# Patient Record
Sex: FEMALE | Race: WHITE | Employment: STUDENT | URBAN - METROPOLITAN AREA
[De-identification: names, ages, dates, MRNs, and addresses within clinical notes are randomized per-mention and may not be internally consistent; named-entity substitution may affect disease eponyms.]

---

## 2018-06-30 ENCOUNTER — HOSPITAL ENCOUNTER (EMERGENCY)
Facility: CLINIC | Age: 18
Discharge: HOME | End: 2018-06-30
Attending: EMERGENCY MEDICINE | Admitting: EMERGENCY MEDICINE
Payer: OTHER GOVERNMENT

## 2018-06-30 VITALS
OXYGEN SATURATION: 100 % | HEART RATE: 77 BPM | SYSTOLIC BLOOD PRESSURE: 113 MMHG | BODY MASS INDEX: 19.99 KG/M2 | HEIGHT: 65 IN | TEMPERATURE: 98 F | DIASTOLIC BLOOD PRESSURE: 69 MMHG | WEIGHT: 120 LBS | RESPIRATION RATE: 18 BRPM
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
## 2018-06-30 DIAGNOSIS — S60.561A INSECT BITE OF RIGHT HAND, INITIAL ENCOUNTER: ICD-10-CM
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
## 2018-06-30 DIAGNOSIS — S80.261A: ICD-10-CM

## 2018-06-30 PROCEDURE — 99282 EMERGENCY DEPT VISIT SF MDM: CPT | Performed by: EMERGENCY MEDICINE

## 2018-06-30 PROCEDURE — 99284 EMERGENCY DEPT VISIT MOD MDM: CPT | Mod: Z6 | Performed by: EMERGENCY MEDICINE

## 2018-06-30 RX ORDER — FEXOFENADINE HCL 60 MG/1
60 TABLET, FILM COATED ORAL 2 TIMES DAILY
Qty: 30 TABLET | Refills: 0 | Status: SHIPPED | OUTPATIENT
Start: 2018-06-30

## 2018-06-30 RX ORDER — ALBUTEROL SULFATE 90 UG/1
2 AEROSOL, METERED RESPIRATORY (INHALATION) EVERY 6 HOURS
COMMUNITY

## 2018-06-30 RX ORDER — NORGESTIMATE AND ETHINYL ESTRADIOL 7DAYSX3 28
1 KIT ORAL DAILY
COMMUNITY

## 2018-06-30 NOTE — ED AVS SNAPSHOT
[facility], Emergency Department    [address] [facility] [address]    Phone:  [phone]                                       Khadijah Henderson   MRN: [id number]    Department:  [facility], Emergency Department   Date of Visit:  6/30/2018           Patient Information     Date Of Birth          2000        Your diagnoses for this visit were:     Insect bite, initial encounter        You were seen by Michael Horn MD.        Discharge Instructions           Insect Bite  Insects most often bite to protect themselves or their nests. Certain bugs, like fleas and mosquitoes, bite to feed. In some cases, the actual bite causes no pain. An itchy red welt or swelling may develop at the site of the bite. Most insect bites do not cause illness. And the itching and swelling most often go away without treatment. However, an infection can develop if the bite is scratched and the skin broken. Rarely, a person may have an allergic reaction to an insect bite.  If a stinger is visible at the bite spot, remove it as quickly as possible, as this can decrease the amount of venom that gets into your body. Scrape it out with a dull edge, such as the edge of a credit card. Try not to squeeze it. Do not try to dig it out, as you may damage the skin and also increase the chance of infection.     To help reduce swelling and itching, apply a cold pack or ice in a zip-top plastic bag wrapped in a thin towel.   Home care    Your healthcare provider may prescribe over-the-counter medicines to help relieve itching and swelling. Use each medicine according to the directions on the package. If the bite becomes infected, you will need an antibiotic. This may be in pill form taken by mouth or as an ointment or cream put directly on the skin. Be sure to use them exactly as prescribed.    Bite symptoms usually go away on their own within a week or two.    To help prevent infection, avoid scratching or picking at the bite.    To help  relieve itching and swelling, apply ice in a zip-top plastic bag wrapped in a thin towel to the bites. Do this for up to 10 minutes at a time. Avoid hot showers or baths as these tend to make itching worse.    An over-the-counter anti-itch medicine such as calamine lotion or an antihistamine cream may be helpful.    If you suspect you have insects in your home, talk to a licensed pest-control professional. He or she can inspect your home and tell you how to get rid of bugs safely.  Follow-up care  Follow up with your healthcare provider, or as advised.  Call 911  Call 911 if any of these occur:    Trouble breathing or swallowing    Wheezing    Feeling like your throat is closing up    Fainting, loss of consciousness    Swelling around the face or mouth  When to seek medical advice  Call your healthcare provider right away if any of these occur:    Fever of 100.4 F (38 C) or higher, or as directed by your healthcare provider    Signs of infection, such as increased swelling and pain, warmth, red streaks, or drainage from the skin    Signs of allergic reaction, such as hives, a spreading rash, or throat itching  Date Last Reviewed: 10/1/2016    6661-4091 [employer]. [address] [place] [address]. All rights reserved. This information is not intended as a substitute for professional medical care. Always follow your healthcare professional's instructions.    Continue topical Benadryl.  Take fexofenadine as directed.    Please make an appointment to follow up with Your Primary Care Provider in 1 week as needed.             24 Hour Appointment Hotline       To make an appointment at any [facility], call [phone] ([phone]). If you don't have a family doctor or clinic, we will help you find one. [facility] clinics are conveniently located to serve the needs of you and your family.             Review of your medicines      START taking        Dose / Directions Last dose taken     fexofenadine 60 MG tablet   Commonly known as:  ALLEGRA ALLERGY   Dose:  60 mg   Quantity:  30 tablet        Take 1 tablet (60 mg) by mouth 2 times daily   Refills:  0          Our records show that you are taking the medicines listed below. If these are incorrect, please call your family doctor or clinic.        Dose / Directions Last dose taken    albuterol 108 (90 Base) MCG/ACT Inhaler   Commonly known as:  PROAIR HFA/PROVENTIL HFA/VENTOLIN HFA   Dose:  2 puff        Inhale 2 puffs into the lungs every 6 hours   Refills:  0        TRINESSA (28) 0.18/0.215/0.25 MG-35 MCG per tablet   Dose:  1 tablet   Generic drug:  norgestim-eth estrad triphasic        Take 1 tablet by mouth daily   Refills:  0                Prescriptions were sent or printed at these locations (1 Prescription)                   Other Prescriptions                Printed at Department/Unit printer (1 of 1)         fexofenadine (ALLEGRA ALLERGY) 60 MG tablet                Orders Needing Specimen Collection     None      Pending Results     No orders found from 6/28/2018 to 7/1/2018.            Pending Culture Results     No orders found from 6/28/2018 to 7/1/2018.            Pending Results Instructions     If you had any lab results that were not finalized at the time of your Discharge, you can call the ED Lab Result RN at [phone]. You will be contacted by this team for any positive Lab results or changes in treatment. The nurses are available 7 days a week from 10A to 6:30P.  You can leave a message 24 hours per day and they will return your call.        Thank you for choosing [facility]       Thank you for choosing [facility] for your care. Our goal is always to provide you with excellent care. Hearing back from our patients is one way we can continue to improve our services. Please take a few minutes to complete the written survey that you may receive in the mail after you visit with us. Thank you!        MyChart Information     MyChart  "lets you send messages to your doctor, view your test results, renew your prescriptions, schedule appointments and more. To sign up, go to [email or web address] . Click on \"Log in\" on the left side of the screen, which will take you to the Welcome page. Then click on \"Sign up Now\" on the right side of the page.     You will be asked to enter the access code listed below, as well as some personal information. Please follow the directions to create your username and password.     Your access code is: [email or web address]  Expires: 2018  4:20 AM     Your access code will  in 90 days. If you need help or a new code, please call your [facility] clinic or [phone].        Care EveryWhere ID     This is your Care EveryWhere ID. This could be used by other organizations to access your [facility] medical records  [id number]        Equal Access to Services     LORIE TURPIN : Hadii thu Cee, waaxda ramon, qaybta kaalmada celio, gabriela escobedo . So [facility] [phone].    ATENCIÓN: Si habla español, tiene a medrano disposición servicios gratuitos de asistencia lingüística. Llame al [phone].    We comply with applicable federal civil rights laws and [place] laws. We do not discriminate on the basis of race, color, national origin, age, disability, sex, sexual orientation, or gender identity.            After Visit Summary       This is your record. Keep this with you and show to your community pharmacist(s) and doctor(s) at your next visit.                  "

## 2018-06-30 NOTE — ED AVS SNAPSHOT
[facility], [place], Emergency Department    [address] [place] [address]    Phone:  [phone]                                       Khadijah Henderson   MRN: [id number]    Department:  [facility], Emergency Department   Date of Visit:  6/30/2018           After Visit Summary Signature Page     I have received my discharge instructions, and my questions have been answered. I have discussed any challenges I see with this plan with the nurse or doctor.    ..........................................................................................................................................  Patient/Patient Representative Signature      ..........................................................................................................................................  Patient Representative Print Name and Relationship to Patient    ..................................................               ................................................  Date                                            Time    ..........................................................................................................................................  Reviewed by Signature/Title    ...................................................              ..............................................  Date                                                            Time

## 2018-06-30 NOTE — DISCHARGE INSTRUCTIONS
Insect Bite  Insects most often bite to protect themselves or their nests. Certain bugs, like fleas and mosquitoes, bite to feed. In some cases, the actual bite causes no pain. An itchy red welt or swelling may develop at the site of the bite. Most insect bites do not cause illness. And the itching and swelling most often go away without treatment. However, an infection can develop if the bite is scratched and the skin broken. Rarely, a person may have an allergic reaction to an insect bite.  If a stinger is visible at the bite spot, remove it as quickly as possible, as this can decrease the amount of venom that gets into your body. Scrape it out with a dull edge, such as the edge of a credit card. Try not to squeeze it. Do not try to dig it out, as you may damage the skin and also increase the chance of infection.     To help reduce swelling and itching, apply a cold pack or ice in a zip-top plastic bag wrapped in a thin towel.   Home care    Your healthcare provider may prescribe over-the-counter medicines to help relieve itching and swelling. Use each medicine according to the directions on the package. If the bite becomes infected, you will need an antibiotic. This may be in pill form taken by mouth or as an ointment or cream put directly on the skin. Be sure to use them exactly as prescribed.    Bite symptoms usually go away on their own within a week or two.    To help prevent infection, avoid scratching or picking at the bite.    To help relieve itching and swelling, apply ice in a zip-top plastic bag wrapped in a thin towel to the bites. Do this for up to 10 minutes at a time. Avoid hot showers or baths as these tend to make itching worse.    An over-the-counter anti-itch medicine such as calamine lotion or an antihistamine cream may be helpful.    If you suspect you have insects in your home, talk to a licensed pest-control professional. He or she can inspect your home and tell you how to get rid of  bugs safely.  Follow-up care  Follow up with your healthcare provider, or as advised.  Call 911  Call 911 if any of these occur:    Trouble breathing or swallowing    Wheezing    Feeling like your throat is closing up    Fainting, loss of consciousness    Swelling around the face or mouth  When to seek medical advice  Call your healthcare provider right away if any of these occur:    Fever of 100.4 F (38 C) or higher, or as directed by your healthcare provider    Signs of infection, such as increased swelling and pain, warmth, red streaks, or drainage from the skin    Signs of allergic reaction, such as hives, a spreading rash, or throat itching  Date Last Reviewed: 10/1/2016    6374-6727 [employer]. [address] [place] [address], [place] [address]. All rights reserved. This information is not intended as a substitute for professional medical care. Always follow your healthcare professional's instructions.    Continue topical Benadryl.  Take fexofenadine as directed.    Please make an appointment to follow up with Your Primary Care Provider in 1 week as needed.

## 2018-06-30 NOTE — ED TRIAGE NOTES
Pt arrived in triage with concerns of swelling on her hands and legs that have been there for the past day. Pt thinks they are bug bites, and says that they are getting bigger.

## 2018-07-01 ASSESSMENT — ENCOUNTER SYMPTOMS
DIFFICULTY URINATING: 0
CONFUSION: 0
EYE REDNESS: 0
COLOR CHANGE: 0
ARTHRALGIAS: 0
SHORTNESS OF BREATH: 0
HEADACHES: 0
NECK STIFFNESS: 0
ABDOMINAL PAIN: 0
FEVER: 0

## 2018-07-01 NOTE — ED PROVIDER NOTES
"  History     Chief Complaint   Patient presents with     Insect Bite     HPI  Khadijah Henderson is a 18 year old female who presents to the emergency department for evaluation of insect bites.  Patient states that she leaves she was bitten by an insect on her right hand and right knee.  She has had some swelling and some itching to the areas for the past day.  She also has some mosquito bites on her left leg which are also itchy.  Patient states that she was concerned because earlier in the day her left hand and wrist were swollen but that is now improved.  She denies any tick exposures.  She denies bull's-eye type rash.  She denies myalgias and arthralgias.  She denies fever.  Patient denies any other illness.  No chest pain or dyspnea.  No cough.  No abdominal pain.  No nausea or vomiting.  She states that she did apply some topical Benadryl earlier that seems to have improved her symptoms.    I have reviewed the Medications, Allergies, Past Medical and Surgical History, and Social History in the Epic system.    Review of Systems   Constitutional: Negative for fever.   HENT: Negative for congestion.    Eyes: Negative for redness.   Respiratory: Negative for shortness of breath.    Cardiovascular: Negative for chest pain.   Gastrointestinal: Negative for abdominal pain.   Genitourinary: Negative for difficulty urinating.   Musculoskeletal: Negative for arthralgias and neck stiffness.   Skin: Negative for color change.   Neurological: Negative for headaches.   Psychiatric/Behavioral: Negative for confusion.   All other systems reviewed and are negative.      Physical Exam   BP: 109/76  Pulse: 77  Temp: 98  F (36.7  C)  Resp: 18  Height: 165.1 cm (5' 5\")  Weight: 54.4 kg (120 lb)  SpO2: 100 %      Physical Exam   Constitutional: She appears well-developed and well-nourished.   HENT:   Head: Normocephalic.   Musculoskeletal:        Right knee: Normal. She exhibits normal range of motion, no swelling and no erythema. No " tenderness found.        Right hand: She exhibits normal range of motion, no tenderness, normal capillary refill and no deformity. Normal sensation noted. Normal strength noted.        Hands:       Legs:  Skin: Skin is warm and dry.   Nursing note and vitals reviewed.      ED Course     ED Course     Procedures            Critical Care time:                  Assessments & Plan (with Medical Decision Making)   18 year old female to emergency department with insect bites to her right hand as well as her right knee and left leg.  Patient was concerned earlier in the day as her left hand and wrist were swollen.  Now, there is minimal swelling and minimal erythema.  She has pruritus.  She does not have warmth or tenderness I do not suspect cellulitis.  She has normal range of motion of her right hand and wrist as well as her right knee so do not suspect septic arthritis.  The patient appears clinically well.  Symptomatic treatment with antihistamines recommended.  Patient does not feel she has had a tick exposure and declines testing for tickborne illness at this time.  Primary care follow-up recommended    I have reviewed the nursing notes.    I have reviewed the findings, diagnosis, plan and need for follow up with the patient.    Discharge Medication List as of 6/30/2018  4:21 AM      START taking these medications    Details   fexofenadine (ALLEGRA ALLERGY) 60 MG tablet Take 1 tablet (60 mg) by mouth 2 times daily, Disp-30 tablet, R-0, Local Print             Final diagnoses:   Insect bite, initial encounter       6/30/2018   [facility], [place], EMERGENCY DEPARTMENT     Michael Horn MD  07/01/18 0618